# Patient Record
Sex: MALE | Race: WHITE | Employment: FULL TIME | URBAN - METROPOLITAN AREA
[De-identification: names, ages, dates, MRNs, and addresses within clinical notes are randomized per-mention and may not be internally consistent; named-entity substitution may affect disease eponyms.]

---

## 2017-06-02 ENCOUNTER — APPOINTMENT (OUTPATIENT)
Dept: LAB | Facility: CLINIC | Age: 42
End: 2017-06-02

## 2017-06-02 ENCOUNTER — TRANSCRIBE ORDERS (OUTPATIENT)
Dept: LAB | Facility: CLINIC | Age: 42
End: 2017-06-02

## 2017-06-02 DIAGNOSIS — R06.03 ACUTE RESPIRATORY DISTRESS: Primary | ICD-10-CM

## 2017-06-02 LAB — VENIPUNCTURE: NORMAL

## 2017-06-02 PROCEDURE — 36415 COLL VENOUS BLD VENIPUNCTURE: CPT | Performed by: DENTIST

## 2017-11-21 ENCOUNTER — GENERIC CONVERSION - ENCOUNTER (OUTPATIENT)
Dept: OTHER | Facility: OTHER | Age: 42
End: 2017-11-21

## 2018-01-22 VITALS
WEIGHT: 243 LBS | HEIGHT: 71 IN | TEMPERATURE: 98.3 F | DIASTOLIC BLOOD PRESSURE: 80 MMHG | SYSTOLIC BLOOD PRESSURE: 122 MMHG | BODY MASS INDEX: 34.02 KG/M2 | HEART RATE: 84 BPM | RESPIRATION RATE: 14 BRPM

## 2018-06-04 ENCOUNTER — OFFICE VISIT (OUTPATIENT)
Dept: FAMILY MEDICINE CLINIC | Facility: CLINIC | Age: 43
End: 2018-06-04
Payer: COMMERCIAL

## 2018-06-04 VITALS
DIASTOLIC BLOOD PRESSURE: 70 MMHG | HEART RATE: 78 BPM | WEIGHT: 225 LBS | RESPIRATION RATE: 12 BRPM | SYSTOLIC BLOOD PRESSURE: 130 MMHG | BODY MASS INDEX: 31.38 KG/M2 | TEMPERATURE: 97.5 F
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
## 2018-06-04 PROCEDURE — 1036F TOBACCO NON-USER: CPT | Performed by: NURSE PRACTITIONER

## 2018-06-04 PROCEDURE — 99214 OFFICE O/P EST MOD 30 MIN: CPT | Performed by: NURSE PRACTITIONER

## 2018-06-04 RX ORDER — DOXYCYCLINE HYCLATE 100 MG/1
CAPSULE ORAL
Qty: 2 CAPSULE | Refills: 0 | Status: SHIPPED | OUTPATIENT
Start: 2018-06-04 | End: 2018-06-04

## 2018-06-04 NOTE — PATIENT INSTRUCTIONS
Doxycycline 100mg, 2 capsules today  Tick Bite   WHAT YOU NEED TO KNOW:   Most tick bites are not dangerous, but ticks can pass disease or infection when they bite  Ticks need to be removed quickly  You may have redness, pain, itching, and swelling near the bite  Blisters may also develop  DISCHARGE INSTRUCTIONS:   Return to the emergency department if:   · You have trouble walking or moving your legs  · You have joint pain, muscle pain, or muscle weakness within 1 month of a tick bite  · You have a fever, chills, headache, or rash  Contact your healthcare provider if:   · You cannot remove the tick  · The tick's head is stuck in your skin  · You have questions or concerns about your condition or care  Medicines:   · Medicines  help decrease pain, redness, itching, and swelling  You may also need medicine to prevent or fight a bacterial infection  These medicines may be given as a cream, lotion, or pill  · Take your medicine as directed  Contact your healthcare provider if you think your medicine is not helping or if you have side effects  Tell him of her if you are allergic to any medicine  Keep a list of the medicines, vitamins, and herbs you take  Include the amounts, and when and why you take them  Bring the list or the pill bottles to follow-up visits  Carry your medicine list with you in case of an emergency  How to remove a tick:  Remove the tick as soon as possible to help prevent disease or infection  You are less likely to get sick from a tick bite if you remove the tick within 24 hours  Do not use petroleum jelly, nail polish, rubbing alcohol, or heat  These do not work and may be dangerous  Do the following to remove a tick:  · First, try a soapy cotton ball  Soak a cotton ball in liquid soap  Cover the tick with the cotton ball for 30 seconds  The tick may come off with the cotton ball when you pull it away  · Use tweezers if the soapy cotton ball does not work   Grasp the tick as close to your skin as possible  Pull the tick straight up and out  Do not touch the tick with your bare hands  · Do not twist or jerk the tick suddenly, because this may break off the tick's head or mouth parts  Do not leave any part of the tick in your skin  · Do not crush or squeeze the tick since its body may be infected with germs  Flush the tick down the toilet  · After the tick is removed, clean the area of the bite with rubbing alcohol  Then wash your hands with soap and water  Apply ice  on your bite for 15 to 20 minutes every hour or as directed  Use an ice pack, or put crushed ice in a plastic bag  Cover it with a towel before you apply it to your skin  Ice helps prevent tissue damage and decreases swelling and pain  Prevent a tick bite:  Ticks live in areas covered by brush and grass  They may even be found in your lawn if you live in certain areas  Outdoor pets can carry ticks inside the house  Ticks can grab onto you or your clothes when you walk by grass or brush  If you go into areas that contain many trees, tall grasses, and underbrush, do the following:  · Wear light colored pants and a long-sleeved shirt  Tuck your pants into your socks or boots  Tuck in your shirt  Wear sleeves that fit close to the skin at your wrists and neck  This will help prevent ticks from crawling through gaps in your clothing and onto your skin  Wear a hat in areas with trees  · Apply insect repellant on your skin  The insect repellant should contain DEET  Do not put insect repellant on skin that is cut, scratched, or irritated  Always use soap and water to wash the insect repellant off as soon as possible once you are indoors  Do not apply insect repellant on your child's face or hands  · Spray insect repellant onto your clothes  Use permethrin spray  This spray kills ticks that crawl on your clothing  Be sure to spray the tops of your boots, bottom of pant legs, and sleeve cuffs   As soon as possible, wash and dry clothing in hot water and high heat  · Check your clothing, hair, and skin for ticks  Shower within 2 hours of coming indoors  Carefully check the hairline, armpits, neck, and waist  Check your pets and children for ticks  Remove ticks from pets the same way as you remove them from people  · Decrease the risk for ticks in your yard  Ticks like to live in shady, moist areas  Tonie Freiberg your lawn regularly to keep the grass short  Trim the grass around birdbaths and fences  Cut branches that are overgrown and take them out of the yard  Clear out leaf piles  Kushal Velha firewood in a dry, julia area  Follow up with your healthcare provider as directed:  Write down your questions so you remember to ask them during your visits  © 2017 [employer] Information is for End User's use only and may not be sold, redistributed or otherwise used for commercial purposes  All illustrations and images included in CareNotes® are the copyrighted property of [email or web address]  or Johann Bennett  The above information is an  only  It is not intended as medical advice for individual conditions or treatments  Talk to your doctor, nurse or pharmacist before following any medical regimen to see if it is safe and effective for you

## 2018-06-04 NOTE — PROGRESS NOTES
Assessment/Plan:    1  Tick bite, initial encounter  tx options reviewed with pt  Since tick bite was within 72 hours, as per CDC recommendations can tx with one time dose of doxycycline    - doxycycline hyclate (VIBRAMYCIN) 100 mg capsule; 2 capsules today  Dispense: 2 capsule; Refill: 0      Subjective:      Patient ID: Cheko Flood is a 36 y o  male who presents for tick removal    Tick bite to left inner thigh  Removed tick in tact  Noticed after working in yard    Friday 6/1/18  Area around tick was red  The following portions of the patient's history were reviewed and updated as appropriate: allergies, current medications, past family history, past medical history, past social history, past surgical history and problem list     Review of Systems   Constitutional: Negative for chills, fatigue and fever  Gastrointestinal: Negative for diarrhea, nausea and vomiting  Musculoskeletal: Negative for arthralgias and myalgias  Skin: Negative for rash (no rash currently but area around tick bite was red)  Neurological: Negative for headaches  Hematological: Negative for adenopathy  Objective:      /70 (BP Location: Left arm, Patient Position: Sitting, Cuff Size: Adult)   Pulse 78   Temp 97 5 °F (36 4 °C) (Temporal)   Resp 12   Wt 102 kg (225 lb)   BMI 31 38 kg/m²          Physical Exam   Constitutional: He is oriented to person, place, and time  He appears well-developed and well-nourished  No distress  Cardiovascular: Normal rate and regular rhythm  Pulmonary/Chest: Effort normal and breath sounds normal    Musculoskeletal: He exhibits no edema  Lymphadenopathy:     He has no cervical adenopathy  Neurological: He is alert and oriented to person, place, and time  Skin: Skin is warm and dry  No rash noted  No erythema  No visible rash or lesion to left inner thigh   Psychiatric: He has a normal mood and affect

## 2024-07-12 ENCOUNTER — HOSPITAL ENCOUNTER (EMERGENCY)
Facility: HOSPITAL | Age: 49
Discharge: HOME | End: 2024-07-12
Attending: STUDENT IN AN ORGANIZED HEALTH CARE EDUCATION/TRAINING PROGRAM
Payer: COMMERCIAL

## 2024-07-12 VITALS
HEART RATE: 80 BPM | DIASTOLIC BLOOD PRESSURE: 66 MMHG | OXYGEN SATURATION: 93 % | TEMPERATURE: 97.5 F | SYSTOLIC BLOOD PRESSURE: 125 MMHG | RESPIRATION RATE: 18 BRPM

## 2024-07-12 DIAGNOSIS — T78.40XA ALLERGIC REACTION, INITIAL ENCOUNTER: Primary | ICD-10-CM

## 2024-07-12 RX ORDER — FAMOTIDINE 10 MG/ML
20 INJECTION, SOLUTION INTRAVENOUS ONCE
Status: COMPLETED | OUTPATIENT
Start: 2024-07-12 | End: 2024-07-12

## 2024-07-12 RX ORDER — PREDNISONE 20 MG/1
60 TABLET ORAL ONCE
Status: COMPLETED | OUTPATIENT
Start: 2024-07-12 | End: 2024-07-12

## 2024-07-12 RX ORDER — EPINEPHRINE 0.3 MG/.3ML
0.3 INJECTION SUBCUTANEOUS ONCE
Qty: 0.6 ML | Refills: 0 | Status: SHIPPED | OUTPATIENT
Start: 2024-07-12 | End: 2024-07-12

## 2024-07-12 RX ORDER — EPINEPHRINE 1 MG/ML
0.5 INJECTION, SOLUTION, CONCENTRATE INTRAVENOUS ONCE
Status: COMPLETED | OUTPATIENT
Start: 2024-07-12 | End: 2024-07-12

## 2024-07-12 RX ORDER — PREDNISONE 20 MG/1
40 TABLET ORAL DAILY
Qty: 8 TABLET | Refills: 0 | Status: SHIPPED | OUTPATIENT
Start: 2024-07-13 | End: 2024-07-17

## 2024-07-12 RX ADMIN — EPINEPHRINE 0.5 MG: 1 INJECTION, SOLUTION, CONCENTRATE INTRAVENOUS at 18:47

## 2024-07-12 RX ADMIN — PREDNISONE 60 MG: 20 TABLET ORAL at 18:50

## 2024-07-12 RX ADMIN — SODIUM CHLORIDE 1000 ML: 0.9 INJECTION, SOLUTION INTRAVENOUS at 18:56

## 2024-07-12 RX ADMIN — FAMOTIDINE 20 MG: 10 INJECTION, SOLUTION INTRAVENOUS at 18:54

## 2024-07-13 NOTE — ED NOTES
Pt seen, assessed and d/c by provider. Pt appeared to be in no acute distress upon discharge. Pt able to ambulate well without assistance upon exiting.      Belinda Aguirre RN  07/12/24 0438

## 2024-07-13 NOTE — ED PROVIDER NOTES
History  Chief Complaint   Patient presents with    Allergic Reaction     Pt states was stung by a bee on left foot about 1 hr ago, having rash, swelling to face and tongue swelling, Dr Díaz at bedside. No respiratory distress currently, wife gave 2 Benadryl at 1816     Pt is a 48-year-old male, past medical history including hyperlipidemia, who presents to the emergency department for allergic reaction.  Patient was stung by bee on his left ankle about an hour prior to arrival.  About 30 minutes ago patient developed a rash, followed by facial swelling and tongue swelling.  Was given 50 of Benadryl by his significant other. Now presents for further evaluation.  Currently patient states that he is able to swallow but the tongue is making it difficult.  No trouble breathing.  No abdominal pain.  No nausea, vomiting or diarrhea.  No allergic reactions like this in the past.  No other complaints or concerns.      Allergic Reaction  Presenting symptoms: rash        None       Past Medical History:   Diagnosis Date    Hyperlipidemia        Past Surgical History:   Procedure Laterality Date    CARDIAC ELECTROPHYSIOLOGY MAPPING AND ABLATION      SHOULDER SURGERY Right     WRIST SURGERY Right     replaced ligament       Family History   Problem Relation Age of Onset    No Known Problems Mother     Stroke Father     Heart attack Father      I have reviewed and agree with the history as documented.    E-Cigarette/Vaping    E-Cigarette Use Never User      E-Cigarette/Vaping Substances     Social History     Tobacco Use    Smoking status: Never    Smokeless tobacco: Never   Vaping Use    Vaping status: Never Used   Substance Use Topics    Alcohol use: Yes     Comment: social    Drug use: No       Review of Systems   HENT:  Positive for facial swelling.    Skin:  Positive for rash.   All other systems reviewed and are negative.      Physical Exam  Physical Exam  Vitals and nursing note reviewed.   Constitutional:        General: He is not in acute distress.     Appearance: He is well-developed. He is not ill-appearing, toxic-appearing or diaphoretic.   HENT:      Head: Normocephalic and atraumatic.      Right Ear: External ear normal.      Left Ear: External ear normal.      Nose: Nose normal.   Eyes:      General: Lids are normal. No scleral icterus.  Cardiovascular:      Rate and Rhythm: Normal rate and regular rhythm.   Pulmonary:      Effort: Pulmonary effort is normal. No respiratory distress.   Musculoskeletal:         General: No deformity. Normal range of motion.      Cervical back: Normal range of motion and neck supple.   Skin:     General: Skin is warm and dry.      Comments: There is scattered hives over patient's entire body.  There is some moderate lip swelling and mild tongue swelling noted.  No pooled secretions.  No stridor.   Neurological:      General: No focal deficit present.      Mental Status: He is alert.   Psychiatric:         Mood and Affect: Mood normal.         Behavior: Behavior normal.         Vital Signs  ED Triage Vitals [07/12/24 1851]   Temperature Pulse Respirations Blood Pressure SpO2   97.5 °F (36.4 °C) 90 16 151/98 99 %      Temp Source Heart Rate Source Patient Position - Orthostatic VS BP Location FiO2 (%)   Temporal Monitor Sitting Right arm --      Pain Score       --           Vitals:    07/12/24 2100 07/12/24 2130 07/12/24 2200 07/12/24 2215   BP: 128/70 129/68 121/66 125/66   Pulse: 78 76 84 80   Patient Position - Orthostatic VS:    Sitting         Visual Acuity      ED Medications  Medications   EPINEPHrine PF (ADRENALIN) 1 mg/mL injection 0.5 mg (0.5 mg Intramuscular Given 7/12/24 1847)   Famotidine (PF) (PEPCID) injection 20 mg (20 mg Intravenous Given 7/12/24 1854)   predniSONE tablet 60 mg (60 mg Oral Given 7/12/24 1850)   sodium chloride 0.9 % bolus 1,000 mL (0 mL Intravenous Stopped 7/12/24 2056)       Diagnostic Studies  Results Reviewed       None                   No orders to  display              Procedures  CriticalCare Time    Date/Time: 7/13/2024 12:06 AM    Performed by: Mayco Díaz DO  Authorized by: Mayco Díaz DO    Critical care provider statement:     Critical care time (minutes):  60    Critical care start time:  7/12/2024 6:40 PM    Critical care end time:  7/12/2024 10:08 PM    Critical care time was exclusive of:  Separately billable procedures and treating other patients and teaching time    Critical care was necessary to treat or prevent imminent or life-threatening deterioration of the following conditions:  Circulatory failure (Anaphylaxis)    Critical care was time spent personally by me on the following activities:  Obtaining history from patient or surrogate, review of old charts, re-evaluation of patient's condition, development of treatment plan with patient or surrogate, evaluation of patient's response to treatment, examination of patient and ordering and performing treatments and interventions    I assumed direction of critical care for this patient from another provider in my specialty: no             ED Course  ED Course as of 07/13/24 0008   Fri Jul 12, 2024 1902 Patient reevaluated.  Reports feeling improvement of his symptoms.  Vital stable.  Will continue to monitor.   1914 Patient again reevaluated.  Continues to report improvement of her symptoms.  Obvious improvement in facial swelling noted.  Will continue to monitor.   2208 Patient reevaluated.  Resting comfortably.  No new complaints.  Feels back to normal.  Will discharge.  EpiPen sent to pharmacy.  Return precautions discussed.  Patient verbalized understanding and agreement plan of care.                                               Medical Decision Making  Patient is a 48 y.o. male who presents to the ED for facial swelling, rash after being stung by a bee.  Patient is nontoxic and well-appearing.  Vitals are stable.    Differential includes but is not limited to: Anaphylaxis,  "angioedema.    Plan: Epinephrine, Pepcid, steroids, observation                 Portions of the record may have been created with voice recognition software. Occasional wrong word or \"sound a like\" substitutions may have occurred due to the inherent limitations of voice recognition software. Read the chart carefully and recognize, using context, where substitutions have occurred.    Risk  Prescription drug management.                 Disposition  Final diagnoses:   Allergic reaction, initial encounter     Time reflects when diagnosis was documented in both MDM as applicable and the Disposition within this note       Time User Action Codes Description Comment    7/12/2024 10:07 PM Mayco Díaz Add [T78.40XA] Allergic reaction, initial encounter           ED Disposition       ED Disposition   Discharge    Condition   Stable    Date/Time   Fri Jul 12, 2024  8:23 PM    Comment   Theron Stephany discharge to home/self care.                   Follow-up Information       Follow up With Specialties Details Why Contact Info Additional Information    Elif Anderson MD Family Medicine   [address] [facility] [address]  [phone]       [facility] Emergency Department Emergency Medicine   [address] [facility] [address]  [phone] [facility] Emergency Department, [address] [place], [place], [address]            Discharge Medication List as of 7/12/2024 10:08 PM        START taking these medications    Details   EPINEPHrine (EPIPEN) 0.3 mg/0.3 mL SOAJ Inject 0.3 mL (0.3 mg total) into a muscle once for 1 dose, Starting Fri 7/12/2024, Normal      predniSONE 20 mg tablet Take 2 tablets (40 mg total) by mouth daily for 4 days Do not start before July 13, 2024., Starting Sat 7/13/2024, Until Wed 7/17/2024, Normal             No discharge procedures on file.    PDMP Review       None            ED Provider  Electronically Signed by             Mayco RUBIO" DO Bre  07/13/24 0008

## 2024-07-13 NOTE — DISCHARGE INSTRUCTIONS
"You have been evaluated in the Emergency Department today for an allergic reaction. You have been given medications to control your symptoms. You have been observed for several hours in the Emergency Department and you are stable for discharge at this time. You can take Benadryl and Pepcid, which are available over the counter, to help control your symptoms at home.    You have been given a prescription for steroids, please take them as directed.    We have also sent a prescription for an Epi-Pen to your pharmacy. Please pick it up as soon as possible. Always carry this with you. In the Emergency Department today, we spoke about how to use the Epi-Pen only in the event of a severe allergic reaction with trouble breathing or throat swelling. You must go to the hospital right away if you ever use the Epi-Pen. Remember that they  every year so you should have your doctor write a new prescription yearly.     Please avoid any known triggers of your allergies.     Please follow up with your primary care physician as soon as possible. If you do not have a primary care physician, you can call \"[facility]\" to schedule an appointment with one    There is a very small chance of a recurrence of the allergic reaction, typically in the next 24 hours. If you see the same symptoms (rash, trouble breathing, vomiting, etc) return, come back to the Emergency Department immediately.     Return to the Emergency Department if you experience rashes, difficulty breathing or swallowing, lip/mouth/tongue swelling, vomiting, or for any other concerning symptoms.  "